# Patient Record
Sex: FEMALE | Race: WHITE | URBAN - METROPOLITAN AREA
[De-identification: names, ages, dates, MRNs, and addresses within clinical notes are randomized per-mention and may not be internally consistent; named-entity substitution may affect disease eponyms.]

---

## 2020-11-25 ENCOUNTER — OFFICE VISIT (OUTPATIENT)
Dept: URBAN - METROPOLITAN AREA CLINIC 72 | Facility: CLINIC | Age: 45
End: 2020-11-25
Payer: COMMERCIAL

## 2020-11-25 DIAGNOSIS — H52.13 MYOPIA, BILATERAL: Primary | ICD-10-CM

## 2020-11-25 PROCEDURE — 92004 COMPRE OPH EXAM NEW PT 1/>: CPT | Performed by: OPTOMETRIST

## 2020-11-25 PROCEDURE — 92310 CONTACT LENS FITTING OU: CPT | Performed by: OPTOMETRIST

## 2020-11-25 ASSESSMENT — VISUAL ACUITY
OS: 20/20
OD: 20/20

## 2020-11-25 ASSESSMENT — INTRAOCULAR PRESSURE
OS: 13
OD: 12

## 2020-11-25 NOTE — IMPRESSION/PLAN
Impression: Myopia, bilateral: H52.13. Bilateral. Plan: A glasses Rx and contact RX has been generated and dispensed. Pt to call with any concerns.
car

## 2021-12-15 ENCOUNTER — OFFICE VISIT (OUTPATIENT)
Dept: URBAN - METROPOLITAN AREA CLINIC 71 | Facility: CLINIC | Age: 46
End: 2021-12-15
Payer: COMMERCIAL

## 2021-12-15 PROCEDURE — 92014 COMPRE OPH EXAM EST PT 1/>: CPT | Performed by: OPTOMETRIST

## 2021-12-15 PROCEDURE — 92310 CONTACT LENS FITTING OU: CPT | Performed by: OPTOMETRIST

## 2021-12-15 ASSESSMENT — VISUAL ACUITY
OS: 20/20
OD: 20/20

## 2021-12-15 ASSESSMENT — INTRAOCULAR PRESSURE
OS: 14
OD: 13

## 2021-12-15 ASSESSMENT — KERATOMETRY
OD: 45.38
OS: 45.63

## 2021-12-15 NOTE — IMPRESSION/PLAN
Impression: Myopia, bilateral: H52.13. Plan: Myopia or nearsightedness develops during school age and is a result of the eyeball being too long, or the cornea having too much curvature. Patients usually complain of not being able to see or read distant objects, such as chalkboard writing, TV screen or movies. Myopia often progresses until patients reach their late twenties or early thirties. Myopia can be managed with corrective eye wear, such as eyeglasses or contacts to correct vision. 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 
Continue to follow annually for vision exams

## 2022-12-15 ENCOUNTER — OFFICE VISIT (OUTPATIENT)
Dept: URBAN - METROPOLITAN AREA CLINIC 71 | Facility: CLINIC | Age: 47
End: 2022-12-15
Payer: COMMERCIAL

## 2022-12-15 DIAGNOSIS — H52.13 MYOPIA, BILATERAL: Primary | ICD-10-CM

## 2022-12-15 PROCEDURE — 92310 CONTACT LENS FITTING OU: CPT | Performed by: OPTOMETRIST

## 2022-12-15 PROCEDURE — 92014 COMPRE OPH EXAM EST PT 1/>: CPT | Performed by: OPTOMETRIST

## 2022-12-15 RX ORDER — ALPRAZOLAM 0.5 MG/1
0.5 MG TABLET ORAL
Qty: 0 | Refills: 0 | Status: ACTIVE
Start: 2022-12-15

## 2022-12-15 ASSESSMENT — VISUAL ACUITY
OD: 20/20
OS: 20/20

## 2022-12-15 ASSESSMENT — INTRAOCULAR PRESSURE
OS: 16
OD: 13

## 2022-12-15 ASSESSMENT — KERATOMETRY
OD: 45.38
OS: 45.38

## 2022-12-15 NOTE — IMPRESSION/PLAN
Impression: Myopia, bilateral: H52.13. Plan: Myopia or nearsightedness develops during school age and is a result of the eyeball being too long, or the cornea having too much curvature. Patients usually complain of not being able to see or read distant objects, such as chalkboard writing, TV screen or movies. Myopia often progresses until patients reach their late twenties or early thirties. Myopia can be managed with corrective eye wear, such as eyeglasses or contacts to correct vision. New glasses and CTL RX given today. Discussed the option of a bifocal but PT declined. She is happy waking her glasses off. Changes in the prescriptions discussed. Continue to follow annually for vision exams.

## 2022-12-21 ENCOUNTER — OFFICE VISIT (OUTPATIENT)
Dept: URBAN - METROPOLITAN AREA CLINIC 71 | Facility: CLINIC | Age: 47
End: 2022-12-21
Payer: COMMERCIAL

## 2022-12-21 DIAGNOSIS — H44.23 DEGENERATIVE MYOPIA, BILATERAL: Primary | ICD-10-CM

## 2022-12-21 PROCEDURE — 99214 OFFICE O/P EST MOD 30 MIN: CPT | Performed by: OPTOMETRIST

## 2022-12-21 ASSESSMENT — INTRAOCULAR PRESSURE
OS: 11
OD: 10

## 2023-12-14 ENCOUNTER — OFFICE VISIT (OUTPATIENT)
Dept: URBAN - METROPOLITAN AREA CLINIC 71 | Facility: CLINIC | Age: 48
End: 2023-12-14
Payer: COMMERCIAL

## 2023-12-14 DIAGNOSIS — H44.23 DEGENERATIVE MYOPIA, BILATERAL: ICD-10-CM

## 2023-12-14 DIAGNOSIS — H52.13 MYOPIA, BILATERAL: Primary | ICD-10-CM

## 2023-12-14 PROCEDURE — 92310 CONTACT LENS FITTING OU: CPT | Performed by: OPTOMETRIST

## 2023-12-14 PROCEDURE — 92014 COMPRE OPH EXAM EST PT 1/>: CPT | Performed by: OPTOMETRIST

## 2023-12-14 ASSESSMENT — KERATOMETRY
OS: 45.63
OD: 45.38

## 2023-12-14 ASSESSMENT — VISUAL ACUITY
OD: 20/20
OS: 20/25

## 2023-12-14 ASSESSMENT — INTRAOCULAR PRESSURE
OS: 16
OD: 12

## 2025-01-30 ENCOUNTER — OFFICE VISIT (OUTPATIENT)
Dept: URBAN - METROPOLITAN AREA CLINIC 71 | Facility: CLINIC | Age: 50
End: 2025-01-30
Payer: COMMERCIAL

## 2025-01-30 DIAGNOSIS — H44.23 DEGENERATIVE MYOPIA, BILATERAL: ICD-10-CM

## 2025-01-30 DIAGNOSIS — H52.13 MYOPIA, BILATERAL: Primary | ICD-10-CM

## 2025-01-30 DIAGNOSIS — H25.13 AGE-RELATED NUCLEAR CATARACT, BILATERAL: ICD-10-CM

## 2025-01-30 DIAGNOSIS — H04.123 DRY EYE SYNDROME OF BILATERAL LACRIMAL GLANDS: ICD-10-CM

## 2025-01-30 PROCEDURE — 92310 CONTACT LENS FITTING OU: CPT

## 2025-01-30 PROCEDURE — 92012 INTRM OPH EXAM EST PATIENT: CPT

## 2025-01-30 ASSESSMENT — INTRAOCULAR PRESSURE
OS: 11
OD: 10

## 2025-01-30 ASSESSMENT — VISUAL ACUITY
OD: 20/20
OS: 20/20